# Patient Record
Sex: MALE | URBAN - METROPOLITAN AREA
[De-identification: names, ages, dates, MRNs, and addresses within clinical notes are randomized per-mention and may not be internally consistent; named-entity substitution may affect disease eponyms.]

---

## 2024-04-08 ENCOUNTER — APPOINTMENT (OUTPATIENT)
Dept: RADIOLOGY | Facility: CLINIC | Age: 44
End: 2024-04-08
Payer: COMMERCIAL

## 2024-04-09 ENCOUNTER — HOSPITAL ENCOUNTER (OUTPATIENT)
Dept: RADIOLOGY | Facility: CLINIC | Age: 44
Discharge: HOME | End: 2024-04-09
Payer: COMMERCIAL

## 2024-04-09 DIAGNOSIS — E78.1 PURE HYPERGLYCERIDEMIA: ICD-10-CM

## 2024-04-09 PROCEDURE — 75571 CT HRT W/O DYE W/CA TEST: CPT

## 2024-12-09 ENCOUNTER — ANCILLARY PROCEDURE (OUTPATIENT)
Dept: URGENT CARE | Age: 44
End: 2024-12-09
Payer: COMMERCIAL

## 2024-12-09 ENCOUNTER — OFFICE VISIT (OUTPATIENT)
Dept: URGENT CARE | Age: 44
End: 2024-12-09
Payer: COMMERCIAL

## 2024-12-09 VITALS
DIASTOLIC BLOOD PRESSURE: 94 MMHG | SYSTOLIC BLOOD PRESSURE: 148 MMHG | OXYGEN SATURATION: 96 % | HEART RATE: 82 BPM | RESPIRATION RATE: 18 BRPM

## 2024-12-09 DIAGNOSIS — R07.1 CHEST PAIN ON BREATHING: ICD-10-CM

## 2024-12-09 DIAGNOSIS — R09.89 CHEST CONGESTION: ICD-10-CM

## 2024-12-09 DIAGNOSIS — R09.89 CHEST CONGESTION: Primary | ICD-10-CM

## 2024-12-09 LAB
POC RAPID INFLUENZA A: NEGATIVE
POC RAPID INFLUENZA B: NEGATIVE
POC SARS-COV-2 AG BINAX: NORMAL

## 2024-12-09 PROCEDURE — 71046 X-RAY EXAM CHEST 2 VIEWS: CPT | Performed by: NURSE PRACTITIONER

## 2024-12-09 NOTE — PATIENT INSTRUCTIONS
Sending to ER for evaluation of chest pain, Shortness of breath, tachycardia  CXR negative for pneumonia, EKG stable  Dr BOSSMAN Bowser notiifed

## 2024-12-09 NOTE — PROGRESS NOTES
SUBJECTIVE:   Blas Kenny is a 44 y.o. male who complains of congestion and productive cough, he also c/o of chest pain, fatigue, and shortness of breath x 1-2 days and denies a history of asthma. Patient denies smoke cigarettes.     OBJECTIVE:  ENT:  General: Vitals noted, no distress, afebrile. Normal phonation, no stridor, no trismus  ENT: TMs clear effusion bilaterally, EACs unremarkable. Mastoids nontender. Posterior oropharynx without erythema, exudate, or swelling. Uvula is in the midline and non-edematous. No Junior's angina.  Neck: Supple. No meningismus through full range of motion. No lymphadenopathy.   Cardiac: tachycardia and reg rhythm, no murmur.  Lungs: Good aeration throughout. No adventitious breath sounds.   Abdomen: Soft, nontender, nonsurgical throughout. Normoactive bowel sounds.   Extremities: No peripheral edema  Skin: No rash  Neuro: No focal neurologic deficits. NIH score is 0.   ASSESSMENT:   Chest pain  Tachycardia  Shortness of breath    PLAN:  Sending to ER for evaluation of chest pain, Shortness of breath, tachycardia  CXR negative for pneumonia, EKG stable  Dr BOSSMAN Bowser notiifed

## 2025-03-10 ENCOUNTER — APPOINTMENT (OUTPATIENT)
Dept: PRIMARY CARE | Facility: CLINIC | Age: 45
End: 2025-03-10
Payer: COMMERCIAL

## 2025-03-10 VITALS
DIASTOLIC BLOOD PRESSURE: 68 MMHG | BODY MASS INDEX: 26.83 KG/M2 | TEMPERATURE: 98.1 F | WEIGHT: 177 LBS | SYSTOLIC BLOOD PRESSURE: 110 MMHG | HEIGHT: 68 IN | OXYGEN SATURATION: 100 % | HEART RATE: 78 BPM

## 2025-03-10 DIAGNOSIS — Z13.0 SCREENING, ANEMIA, DEFICIENCY, IRON: ICD-10-CM

## 2025-03-10 DIAGNOSIS — Z00.00 ROUTINE GENERAL MEDICAL EXAMINATION AT A HEALTH CARE FACILITY: Primary | ICD-10-CM

## 2025-03-10 DIAGNOSIS — Z13.220 LIPID SCREENING: ICD-10-CM

## 2025-03-10 DIAGNOSIS — Z13.1 SCREENING FOR DIABETES MELLITUS: ICD-10-CM

## 2025-03-10 PROCEDURE — 1036F TOBACCO NON-USER: CPT | Performed by: FAMILY MEDICINE

## 2025-03-10 PROCEDURE — 99396 PREV VISIT EST AGE 40-64: CPT | Performed by: FAMILY MEDICINE

## 2025-03-10 PROCEDURE — 3008F BODY MASS INDEX DOCD: CPT | Performed by: FAMILY MEDICINE

## 2025-03-10 RX ORDER — ALBUTEROL SULFATE 90 UG/1
2 INHALANT RESPIRATORY (INHALATION) EVERY 4 HOURS PRN
COMMUNITY

## 2025-03-10 ASSESSMENT — ENCOUNTER SYMPTOMS
APPETITE CHANGE: 0
SHORTNESS OF BREATH: 0
FEVER: 0
ACTIVITY CHANGE: 0
FATIGUE: 0
CHILLS: 0
ABDOMINAL PAIN: 0
LIGHT-HEADEDNESS: 0
DIZZINESS: 0

## 2025-03-10 ASSESSMENT — PATIENT HEALTH QUESTIONNAIRE - PHQ9
2. FEELING DOWN, DEPRESSED OR HOPELESS: NOT AT ALL
1. LITTLE INTEREST OR PLEASURE IN DOING THINGS: NOT AT ALL
SUM OF ALL RESPONSES TO PHQ9 QUESTIONS 1 AND 2: 0

## 2025-03-10 NOTE — PROGRESS NOTES
"Subjective   Patient ID: Blas Yostyfarrah Kenny is a 44 y.o. male who presents for New Patient Visit and Annual Exam.    HPI   The patient presents for his annual wellness exam.   Hx of colonoscopy:   Hx of prostate cancer screening (men 55-69):   AAA screening (men 65-75 smoking hx):   Lung cancer screening:   HIV and Hep C Screening:   STD screening:   Immunizations:  History of depression or anxiety:  Diet: Follows a healthy diet  Exercise: Gets regular exercise      CT CARDIAC SCORING WO IV CONTRAST  Reviewed date:04/11/2024 08:41:48 AM  Interpretation:score of 0  Performing Lab:  Notes/Report:    Review of Systems   Constitutional:  Negative for activity change, appetite change, chills, fatigue and fever.   Respiratory:  Negative for shortness of breath.    Cardiovascular:  Negative for chest pain.   Gastrointestinal:  Negative for abdominal pain.   Skin:  Negative for rash.   Neurological:  Negative for dizziness and light-headedness.       Objective   /68   Pulse 78   Temp 36.7 °C (98.1 °F)   Ht 1.727 m (5' 8\")   Wt 80.3 kg (177 lb)   SpO2 100%   BMI 26.91 kg/m²     Physical Exam  Vitals reviewed.   Constitutional:       Appearance: Normal appearance. He is normal weight.   Eyes:      Pupils: Pupils are equal, round, and reactive to light.   Cardiovascular:      Rate and Rhythm: Normal rate and regular rhythm.   Pulmonary:      Effort: Pulmonary effort is normal.      Breath sounds: Normal breath sounds.   Abdominal:      General: Abdomen is flat. Bowel sounds are normal.      Palpations: Abdomen is soft.   Skin:     General: Skin is warm.   Neurological:      Mental Status: He is alert and oriented to person, place, and time. Mental status is at baseline.   Psychiatric:         Mood and Affect: Mood normal.         Assessment/Plan   Assessment & Plan  Routine general medical examination at a health care facility  Recommendations for men annual wellness exam:  Colonoscopy at age 45 or earlier if " positive family history of colon cancer  Discuss prostate cancer screening between ages 55-69 or sooner if family history of prostate cancer  One time screen for abdominal aortic aneurysm for men ages 65-75 who have ever smoked  Screening for lung cancer with low-dose CT in all adults age 55-77 who have a 30 pack-year smoking history and currently smoke or have quit within the past 15 years  Follow a healthy diet (Dash diet, Mediterranean diet)  Exercise 150 min/wk  Maintain healthy weight (BMI < 25)  Do not smoke  Alcohol in moderation (up to 2 drinks/day)  Get enough sleep (7-8 hours/night)  Make sure immunizations are up to date (influenza, pneumococcal, Tdap, shingles if age > 50)  Visit dentist twice yearly   Orders:    Lipid Panel; Future    Comprehensive Metabolic Panel; Future    CBC and Auto Differential; Future    CBC and Auto Differential; Future    Comprehensive Metabolic Panel; Future    Lipid Panel; Future    Lipid screening    Orders:    Lipid Panel; Future    Lipid Panel; Future    Screening, anemia, deficiency, iron    Orders:    CBC and Auto Differential; Future    CBC and Auto Differential; Future    Screening for diabetes mellitus    Orders:    Comprehensive Metabolic Panel; Future    Comprehensive Metabolic Panel; Future

## 2025-03-11 LAB
ALBUMIN SERPL-MCNC: 4.6 G/DL (ref 3.6–5.1)
ALP SERPL-CCNC: 65 U/L (ref 36–130)
ALT SERPL-CCNC: 19 U/L (ref 9–46)
ANION GAP SERPL CALCULATED.4IONS-SCNC: 10 MMOL/L (CALC) (ref 7–17)
AST SERPL-CCNC: 17 U/L (ref 10–40)
BASOPHILS # BLD AUTO: 71 CELLS/UL (ref 0–200)
BASOPHILS NFR BLD AUTO: 1 %
BILIRUB SERPL-MCNC: 1 MG/DL (ref 0.2–1.2)
BUN SERPL-MCNC: 13 MG/DL (ref 7–25)
CALCIUM SERPL-MCNC: 9.7 MG/DL (ref 8.6–10.3)
CHLORIDE SERPL-SCNC: 104 MMOL/L (ref 98–110)
CHOLEST SERPL-MCNC: 222 MG/DL
CHOLEST/HDLC SERPL: 5.2 (CALC)
CO2 SERPL-SCNC: 27 MMOL/L (ref 20–32)
CREAT SERPL-MCNC: 0.95 MG/DL (ref 0.6–1.29)
EGFRCR SERPLBLD CKD-EPI 2021: 102 ML/MIN/1.73M2
EOSINOPHIL # BLD AUTO: 490 CELLS/UL (ref 15–500)
EOSINOPHIL NFR BLD AUTO: 6.9 %
ERYTHROCYTE [DISTWIDTH] IN BLOOD BY AUTOMATED COUNT: 13.8 % (ref 11–15)
GLUCOSE SERPL-MCNC: 96 MG/DL (ref 65–99)
HCT VFR BLD AUTO: 46.9 % (ref 38.5–50)
HDLC SERPL-MCNC: 43 MG/DL
HGB BLD-MCNC: 15.5 G/DL (ref 13.2–17.1)
LDLC SERPL CALC-MCNC: 135 MG/DL (CALC)
LYMPHOCYTES # BLD AUTO: 1938 CELLS/UL (ref 850–3900)
LYMPHOCYTES NFR BLD AUTO: 27.3 %
MCH RBC QN AUTO: 29.8 PG (ref 27–33)
MCHC RBC AUTO-ENTMCNC: 33 G/DL (ref 32–36)
MCV RBC AUTO: 90 FL (ref 80–100)
MONOCYTES # BLD AUTO: 767 CELLS/UL (ref 200–950)
MONOCYTES NFR BLD AUTO: 10.8 %
NEUTROPHILS # BLD AUTO: 3834 CELLS/UL (ref 1500–7800)
NEUTROPHILS NFR BLD AUTO: 54 %
NONHDLC SERPL-MCNC: 179 MG/DL (CALC)
PLATELET # BLD AUTO: 249 THOUSAND/UL (ref 140–400)
PMV BLD REES-ECKER: 9.9 FL (ref 7.5–12.5)
POTASSIUM SERPL-SCNC: 4.9 MMOL/L (ref 3.5–5.3)
PROT SERPL-MCNC: 8.1 G/DL (ref 6.1–8.1)
RBC # BLD AUTO: 5.21 MILLION/UL (ref 4.2–5.8)
SODIUM SERPL-SCNC: 141 MMOL/L (ref 135–146)
TRIGL SERPL-MCNC: 285 MG/DL
WBC # BLD AUTO: 7.1 THOUSAND/UL (ref 3.8–10.8)

## 2026-03-10 ENCOUNTER — APPOINTMENT (OUTPATIENT)
Dept: PRIMARY CARE | Facility: CLINIC | Age: 46
End: 2026-03-10
Payer: COMMERCIAL